# Patient Record
Sex: MALE
[De-identification: names, ages, dates, MRNs, and addresses within clinical notes are randomized per-mention and may not be internally consistent; named-entity substitution may affect disease eponyms.]

---

## 2021-02-08 PROBLEM — Z00.00 ENCOUNTER FOR PREVENTIVE HEALTH EXAMINATION: Status: ACTIVE | Noted: 2021-02-08

## 2021-02-09 ENCOUNTER — APPOINTMENT (OUTPATIENT)
Dept: UROLOGY | Facility: CLINIC | Age: 36
End: 2021-02-09

## 2021-03-16 ENCOUNTER — APPOINTMENT (OUTPATIENT)
Dept: UROLOGY | Facility: CLINIC | Age: 36
End: 2021-03-16
Payer: COMMERCIAL

## 2021-03-16 VITALS
TEMPERATURE: 97.2 F | WEIGHT: 192 LBS | BODY MASS INDEX: 30.13 KG/M2 | HEIGHT: 67 IN | DIASTOLIC BLOOD PRESSURE: 71 MMHG | SYSTOLIC BLOOD PRESSURE: 115 MMHG | HEART RATE: 56 BPM

## 2021-03-16 DIAGNOSIS — N50.811 RIGHT TESTICULAR PAIN: ICD-10-CM

## 2021-03-16 PROCEDURE — 99204 OFFICE O/P NEW MOD 45 MIN: CPT | Mod: 25

## 2021-03-16 PROCEDURE — 51798 US URINE CAPACITY MEASURE: CPT

## 2021-03-16 PROCEDURE — 99072 ADDL SUPL MATRL&STAF TM PHE: CPT

## 2021-03-16 NOTE — ASSESSMENT
[FreeTextEntry1] : Urinary urgency:\par PVR: 0 ml. \par Will get Urinalysis with reflex Urine culture. \par Will inform results.\par  \par Right testis pain:\par Will get Scrotal Ultrasound. \par Will inform results. \par \par Hypogonadism:\par Discussed risks and benefits of Testosterone replacement therapy especially affect on spermatogenesis. \par Will consider. \par Will get Total, Free and Bio available Testosterone with LH, FSH and PRL. \par Will inform results. \par \par

## 2021-03-16 NOTE — PHYSICAL EXAM
[Normal Appearance] : normal appearance [General Appearance - In No Acute Distress] : no acute distress [] : no respiratory distress [Abdomen Soft] : soft [Abdomen Tenderness] : non-tender [Costovertebral Angle Tenderness] : no ~M costovertebral angle tenderness [Urethral Meatus] : meatus normal [Penis Abnormality] : normal circumcised penis [Scrotum] : the scrotum was normal [Epididymis] : the epididymides were normal [Testes Mass (___cm)] : there were no testicular masses [Prostate Enlargement] : the prostate was not enlarged [Prostate Tenderness] : the prostate was not tender [No Prostate Nodules] : no prostate nodules [FreeTextEntry1] : right testis tender to touch  [Normal Station and Gait] : the gait and station were normal for the patient's age [Skin Color & Pigmentation] : normal skin color and pigmentation [No Focal Deficits] : no focal deficits [Oriented To Time, Place, And Person] : oriented to person, place, and time

## 2021-03-16 NOTE — HISTORY OF PRESENT ILLNESS
[FreeTextEntry1] : 34 yo male presents for Hypogonadism. \par Had Testosterone checked for feeling tired, fatigued and low libido: low. \par Rates erections as 3-4/5. \par Reports slower stream, urinates every 1-2 hours or so during the day. Nocturia of 0-1 x. \par Endorses urgency and post void dribbling.\par Denies hesitancy, straining, intermittency, incontinence, sense of incomplete emptying.\par Denies dysuria, hematuria, lower abdominal or flank pain, fever, chills or rigors.\par Has off and on right testis pain. \par No family history of Prostate cancer. \par Patient  and has 2 children. \par \par

## 2021-03-16 NOTE — REVIEW OF SYSTEMS
[Feeling Tired] : feeling tired [Recent Weight Gain (___ Lbs)] : recent [unfilled] ~Ulb weight gain [Dry Eyes] : dryness of the eyes [Loss of interest] : loss of interest in sexual activity [Poor quality erections] : Poor quality erections [Joint Pain] : joint pain [Negative] : Heme/Lymph

## 2021-03-16 NOTE — LETTER BODY
[Dear  ___] : Dear  [unfilled], [Consult Letter:] : I had the pleasure of evaluating your patient, [unfilled]. [( Thank you for referring [unfilled] for consultation for _____ )] : Thank you for referring [unfilled] for consultation for [unfilled] [Please see my note below.] : Please see my note below. [Consult Closing:] : Thank you very much for allowing me to participate in the care of this patient.  If you have any questions, please do not hesitate to contact me. [Sincerely,] : Sincerely, [FreeTextEntry3] : Sherif Persaud MD\par  of Urology\par [facility]\par \par Offices:\par [facility] @\par [address], [place] [address]\par and\par [address], [place] [address], [address]\par and\par [address] [address]\par \par TEL: [phone]\par FAX: [phone]

## 2021-03-17 LAB
APPEARANCE: CLEAR
BILIRUBIN URINE: NEGATIVE
BLOOD URINE: NEGATIVE
COLOR: NORMAL
GLUCOSE QUALITATIVE U: NEGATIVE
KETONES URINE: NEGATIVE
LEUKOCYTE ESTERASE URINE: NEGATIVE
NITRITE URINE: NEGATIVE
PH URINE: 5.5
PROTEIN URINE: NEGATIVE
SPECIFIC GRAVITY URINE: 1.03
UROBILINOGEN URINE: NORMAL

## 2022-01-25 ENCOUNTER — APPOINTMENT (OUTPATIENT)
Dept: UROLOGY | Facility: CLINIC | Age: 37
End: 2022-01-25
Payer: COMMERCIAL

## 2022-01-25 VITALS — HEART RATE: 63 BPM | DIASTOLIC BLOOD PRESSURE: 99 MMHG | SYSTOLIC BLOOD PRESSURE: 118 MMHG | TEMPERATURE: 98 F

## 2022-01-25 DIAGNOSIS — E29.1 TESTICULAR HYPOFUNCTION: ICD-10-CM

## 2022-01-25 LAB
FSH SERPL-MCNC: 3.4 IU/L
LH SERPL-ACNC: 3 IU/L
PROLACTIN SERPL-MCNC: 83.7 NG/ML

## 2022-01-25 PROCEDURE — 99213 OFFICE O/P EST LOW 20 MIN: CPT

## 2022-01-25 NOTE — HISTORY OF PRESENT ILLNESS
[FreeTextEntry1] : 35 yo male presents for follow up. \par Tried Tadalafil 10 mg gets inconsistent response. \par Complaining of low libido. \par Saw Endocrinologist, has follow up later this month. \par Has had Epidural 2 x, urinating better and improved testis pain. \par Has off and on headache after Epidural. \par \par Initially seen for Hypogonadism. \par Had Testosterone checked for feeling tired, fatigued and low libido: low. \par Rated erections as 3-4/5. \par Reported slower stream, urinates every 1-2 hours or so during the day. Nocturia of 0-1 x. \par Endorsed urgency and post void dribbling.\par Denied hesitancy, straining, intermittency, incontinence, sense of incomplete emptying.\par Denied dysuria, hematuria, lower abdominal or flank pain, fever, chills or rigors.\par Had off and on right testis pain. \par No family history of Prostate cancer. \par Patient  and has 2 children. \par \par

## 2022-01-25 NOTE — LETTER BODY
[Dear  ___] : Dear  [unfilled], [Courtesy Letter:] : I had the pleasure of seeing your patient, [unfilled], in my office today. [Please see my note below.] : Please see my note below. [Sincerely,] : Sincerely, [FreeTextEntry3] : Sherif Persaud MD\par  of Urology\par [facility]\par \par Offices:\par [facility] @\par [address], [place] [address]\par and\par [address] [place], [place] [address], [address]\par and\par [address] [address]\par \par TEL: [phone]\par FAX: [phone]

## 2022-01-25 NOTE — ASSESSMENT
[FreeTextEntry1] : Reviewed outside records. \par Testosterone: 362(1/20/22). \par \par Erectile dysfunction:\par Discussed treatment options. Discussed proper use of Tadalafil. \par Will try 20 mg. \par \par Hypogonadism:\par Discussed work up so far. \par Recommended to follow up with Endocrinologist regarding elevated PRL. \par \par Return to office in 3 months or sooner if any issues.

## 2022-01-26 LAB
TESTOSTERONE FREE/WEAKLY BND: 98.3 NG/DL
TESTOSTERONE TOTAL S: 375 NG/DL
TESTOSTERONE, % FREE/WEAKLY BND: 26.2 %

## 2022-04-26 ENCOUNTER — APPOINTMENT (OUTPATIENT)
Dept: UROLOGY | Facility: CLINIC | Age: 37
End: 2022-04-26
Payer: COMMERCIAL

## 2022-04-26 VITALS — TEMPERATURE: 97.8 F

## 2022-04-26 PROCEDURE — 99213 OFFICE O/P EST LOW 20 MIN: CPT

## 2022-04-26 NOTE — HISTORY OF PRESENT ILLNESS
[FreeTextEntry1] : 35 yo male presents for follow up. \par Saw Endocrinologist: Markus Fuentes, found to have Pituitary lesion, on medication. \par Also saw Neurologist for headaches\par With Tadalafil 20 mg better response 5/5, is able to attain and maintain the erections. \par \par Initially seen for Hypogonadism. \par Had Testosterone checked for feeling tired, fatigued and low libido: low. \par Rated erections as 3-4/5. \par Reported slower stream, urinates every 1-2 hours or so during the day. Nocturia of 0-1 x. \par Endorsed urgency and post void dribbling.\par Denied hesitancy, straining, intermittency, incontinence, sense of incomplete emptying.\par Denied dysuria, hematuria, lower abdominal or flank pain, fever, chills or rigors.\par Had off and on right testis pain. \par No family history of Prostate cancer. \par Patient  and has 2 children. \par \par

## 2022-04-26 NOTE — ASSESSMENT
[FreeTextEntry1] : Will get records from Endocrinologist. \par \par Erectile dysfunction:\par Continue Tadalafil \par \par Return to office in 6 months or sooner if any issues.

## 2022-10-25 ENCOUNTER — APPOINTMENT (OUTPATIENT)
Dept: UROLOGY | Facility: CLINIC | Age: 37
End: 2022-10-25

## 2022-10-25 VITALS
HEIGHT: 67 IN | SYSTOLIC BLOOD PRESSURE: 114 MMHG | TEMPERATURE: 97.1 F | WEIGHT: 189 LBS | DIASTOLIC BLOOD PRESSURE: 68 MMHG | BODY MASS INDEX: 29.66 KG/M2

## 2022-10-25 PROCEDURE — 99213 OFFICE O/P EST LOW 20 MIN: CPT

## 2022-10-30 NOTE — HISTORY OF PRESENT ILLNESS
[FreeTextEntry1] : 38 yo male presents for follow up. \par With Tadalafil 20 mg better response, 5/5. Able to attain and maintain the erections. \par \par Saw Endocrinologist: Markus Fuentes, found to have Pituitary lesion, still on medication. \par Also saw Neurologist for headaches. Nothing found. headache better: possibly Migraine. \par \par \par Initially seen for Hypogonadism. \par Had Testosterone checked for feeling tired, fatigued and low libido: low. \par Rated erections as 3-4/5. \par Reported slower stream, urinates every 1-2 hours or so during the day. Nocturia of 0-1 x. \par Endorsed urgency and post void dribbling.\par Denied hesitancy, straining, intermittency, incontinence, sense of incomplete emptying.\par Denied dysuria, hematuria, lower abdominal or flank pain, fever, chills or rigors.\par Had off and on right testis pain. \par No family history of Prostate cancer. \par Patient  and has 2 children. \par \par

## 2022-10-30 NOTE — LETTER BODY
[Dear  ___] : Dear  [unfilled], [Courtesy Letter:] : I had the pleasure of seeing your patient, [unfilled], in my office today. [Please see my note below.] : Please see my note below. [Sincerely,] : Sincerely, [FreeTextEntry3] : Sherif Persaud MD\par  of Urology\par [facility]\par \par [facility]\par Offices:\par [address] [facility] [address]\par [address] [address]\par [address], [address]\par \par TEL: [phone]\par FAX: [phone]

## 2022-10-30 NOTE — ASSESSMENT
[FreeTextEntry1] : Erectile dysfunction \par Continue Tadalafil 20 mg PRN. \par \par Return to office in 1 year or sooner if any issues.

## 2023-07-07 ENCOUNTER — NON-APPOINTMENT (OUTPATIENT)
Age: 38
End: 2023-07-07

## 2023-07-28 ENCOUNTER — NON-APPOINTMENT (OUTPATIENT)
Age: 38
End: 2023-07-28

## 2023-07-28 ENCOUNTER — LABORATORY RESULT (OUTPATIENT)
Age: 38
End: 2023-07-28

## 2023-07-28 ENCOUNTER — APPOINTMENT (OUTPATIENT)
Dept: UROLOGY | Facility: CLINIC | Age: 38
End: 2023-07-28
Payer: COMMERCIAL

## 2023-07-28 VITALS
RESPIRATION RATE: 16 BRPM | WEIGHT: 189 LBS | BODY MASS INDEX: 29.66 KG/M2 | HEART RATE: 60 BPM | DIASTOLIC BLOOD PRESSURE: 66 MMHG | HEIGHT: 67 IN | SYSTOLIC BLOOD PRESSURE: 110 MMHG | OXYGEN SATURATION: 97 %

## 2023-07-28 DIAGNOSIS — N43.3 HYDROCELE, UNSPECIFIED: ICD-10-CM

## 2023-07-28 PROCEDURE — 99214 OFFICE O/P EST MOD 30 MIN: CPT

## 2023-07-28 RX ORDER — IBUPROFEN 600 MG/1
600 TABLET, FILM COATED ORAL 3 TIMES DAILY
Qty: 15 | Refills: 1 | Status: ACTIVE | COMMUNITY
Start: 2023-07-28 | End: 1900-01-01

## 2023-08-03 DIAGNOSIS — R82.998 OTHER ABNORMAL FINDINGS IN URINE: ICD-10-CM

## 2023-08-09 LAB
HSV 1+2 IGG SER IA-IMP: POSITIVE
HSV1 IGG SER QL: 52.6 INDEX

## 2023-08-10 ENCOUNTER — APPOINTMENT (OUTPATIENT)
Dept: DERMATOLOGY | Facility: CLINIC | Age: 38
End: 2023-08-10
Payer: COMMERCIAL

## 2023-08-10 PROCEDURE — 99204 OFFICE O/P NEW MOD 45 MIN: CPT | Mod: 25

## 2023-08-10 PROCEDURE — 17110 DESTRUCTION B9 LES UP TO 14: CPT

## 2023-08-11 ENCOUNTER — OUTPATIENT (OUTPATIENT)
Dept: OUTPATIENT SERVICES | Facility: HOSPITAL | Age: 38
LOS: 1 days | End: 2023-08-11
Payer: COMMERCIAL

## 2023-08-11 ENCOUNTER — APPOINTMENT (OUTPATIENT)
Dept: ULTRASOUND IMAGING | Facility: CLINIC | Age: 38
End: 2023-08-11
Payer: COMMERCIAL

## 2023-08-11 DIAGNOSIS — R82.998 OTHER ABNORMAL FINDINGS IN URINE: ICD-10-CM

## 2023-08-11 PROCEDURE — 76770 US EXAM ABDO BACK WALL COMP: CPT

## 2023-08-11 PROCEDURE — 76770 US EXAM ABDO BACK WALL COMP: CPT | Mod: 26

## 2023-08-15 ENCOUNTER — TRANSCRIPTION ENCOUNTER (OUTPATIENT)
Age: 38
End: 2023-08-15

## 2023-08-15 ENCOUNTER — APPOINTMENT (OUTPATIENT)
Dept: UROLOGY | Facility: CLINIC | Age: 38
End: 2023-08-15
Payer: COMMERCIAL

## 2023-08-15 VITALS
DIASTOLIC BLOOD PRESSURE: 68 MMHG | WEIGHT: 180 LBS | HEIGHT: 67 IN | SYSTOLIC BLOOD PRESSURE: 110 MMHG | HEART RATE: 53 BPM | BODY MASS INDEX: 28.25 KG/M2

## 2023-08-15 DIAGNOSIS — R39.15 URGENCY OF URINATION: ICD-10-CM

## 2023-08-15 DIAGNOSIS — N50.811 RIGHT TESTICULAR PAIN: ICD-10-CM

## 2023-08-15 DIAGNOSIS — Z71.1 PERSON WITH FEARED HEALTH COMPLAINT IN WHOM NO DIAGNOSIS IS MADE: ICD-10-CM

## 2023-08-15 DIAGNOSIS — N50.812 RIGHT TESTICULAR PAIN: ICD-10-CM

## 2023-08-15 PROBLEM — N43.3 BILATERAL HYDROCELE: Status: ACTIVE | Noted: 2023-08-15

## 2023-08-15 LAB
APPEARANCE: ABNORMAL
BACTERIA UR CULT: NORMAL
BILIRUBIN URINE: NEGATIVE
BLOOD URINE: NEGATIVE
C TRACH RRNA SPEC QL NAA+PROBE: NOT DETECTED
COLOR: YELLOW
CYTOMEGALOVIRUS ABS IGM: <8 AU/ML
GLUCOSE QUALITATIVE U: NEGATIVE MG/DL
HERPES SIMPLEX 1 AND 2 ABS IGM: 0.45 IV
HIV1+2 AB SPEC QL IA.RAPID: NONREACTIVE
HSV 1+2 IGG SER IA-IMP: NEGATIVE
HSV 1+2 IGG SER IA-IMP: POSITIVE
HSV1 IGG SER QL: 47.3 INDEX
HSV2 IGG SER QL: 0.03 INDEX
KETONES URINE: NEGATIVE MG/DL
LEUKOCYTE ESTERASE URINE: NEGATIVE
N GONORRHOEA RRNA SPEC QL NAA+PROBE: NOT DETECTED
NITRITE URINE: NEGATIVE
PH URINE: 7
PROTEIN URINE: NEGATIVE MG/DL
RPR SER-TITR: NORMAL
RUBV IGM FLD-ACNC: <10 AU/ML
SOURCE AMPLIFICATION: NORMAL
SPECIFIC GRAVITY URINE: 1.02
TOXOPLASMA GONDII ABS IGM: <3 AU/ML
UROBILINOGEN URINE: 1 MG/DL

## 2023-08-15 PROCEDURE — 99214 OFFICE O/P EST MOD 30 MIN: CPT

## 2023-08-15 RX ORDER — ALFUZOSIN HYDROCHLORIDE 10 MG/1
10 TABLET, EXTENDED RELEASE ORAL DAILY
Qty: 30 | Refills: 5 | Status: ACTIVE | COMMUNITY
Start: 2023-08-15 | End: 1900-01-01

## 2023-08-15 NOTE — ASSESSMENT
[FreeTextEntry1] : Reviewed records. Discussed imaging.   Testis pain:  Hydrocele: Discussed treatment options- continued observation, aspiration and surgical repair. Risks and benefits of each were discussed.  Discussed does not have clinically significant hydrocele. Discussed concern testis pain could be secondary to back problem. Will get Urinalysis and Urine culture.  Take Ibuprofen as needed.  STD screening: Will get sexually transmitted disease panel.  Will inform results.

## 2023-08-15 NOTE — HISTORY OF PRESENT ILLNESS
[FreeTextEntry1] : 39 yo male presents for testis pain.  Has been having L>R 3-4/10 testis pain for last 2 weeks.  Has history of disc prolapse and has had on and off left testis pain with flare up in the past.  Had scrotal ultrasound. Denies constipation. Denies any difficulty with urination except has on and off urgency. Denies dysuria, hematuria, lower abdominal or flank pain, nausea, vomiting, fever, chills or rigors.  Stopped Tadalafil. In the past with Tadalafil 20 mg got 5/5 response.  Was able to attain and maintain the erections.  Was taking Cabergoline, had a headache. Will like to have STD screen.  Saw Endocrinologist: Markus Fuentes, found to have Pituitary lesion, still on medication.  Also saw Neurologist for headaches. Nothing found. headache better: possibly Migraine.   Initially seen for Hypogonadism.  Had Testosterone checked for feeling tired, fatigued and low libido: low.  Rated erections as 3-4/5.  Reported slower stream, urinates every 1-2 hours or so during the day. Nocturia of 0-1 x.  Endorsed urgency and post void dribbling. Denied hesitancy, straining, intermittency, incontinence, sense of incomplete emptying. Denied dysuria, hematuria, lower abdominal or flank pain, fever, chills or rigors. Had off and on right testis pain.  No family history of Prostate cancer.  Patient  and has 2 children.

## 2023-08-15 NOTE — PHYSICAL EXAM
[Urethral Meatus] : meatus normal [Penis Abnormality] : normal circumcised penis [Scrotum] : the scrotum was normal [Testes Tenderness] : no tenderness of the testes [Testes Mass (___cm)] : there were no testicular masses [Normal Appearance] : normal appearance [General Appearance - In No Acute Distress] : no acute distress [FreeTextEntry1] : normal peripheral circulation  [] : no respiratory distress [Oriented To Time, Place, And Person] : oriented to person, place, and time

## 2023-08-21 ENCOUNTER — APPOINTMENT (OUTPATIENT)
Dept: DERMATOLOGY | Facility: CLINIC | Age: 38
End: 2023-08-21

## 2023-09-04 PROBLEM — Z71.1 CONCERN ABOUT STD IN MALE WITHOUT DIAGNOSIS: Status: ACTIVE | Noted: 2023-07-28

## 2023-09-10 NOTE — ASSESSMENT
[FreeTextEntry1] : Reviewed records. Discussed labs and imaging.   Testis pain: Urinary urgency: Discussed treatment options, will start Alfuzosin. Prescribed Alfuzosin. Explained common side effects: nasal congestion, cough, muscle pain, back pain, dizziness, orthostatic hypotension, somnolence, retrograde ejaculation, decreased libido and erectile dysfunction.  STD screen: Discussed labs.  Return to office in 4 weeks or sooner if any issues.

## 2023-09-10 NOTE — PHYSICAL EXAM
[Normal Appearance] : normal appearance [General Appearance - In No Acute Distress] : no acute distress [] : no respiratory distress [FreeTextEntry1] : normal peripheral circulation  [Oriented To Time, Place, And Person] : oriented to person, place, and time

## 2023-09-10 NOTE — HISTORY OF PRESENT ILLNESS
[FreeTextEntry1] : 38 years old male presents for follow up  Had STD testing and Renal and Bladder Ultrasound  Same testis pain.  No personal history or family history of kidney stone.   testis pain.  Has been having L>R 3-4/10 testis pain for last 2 weeks.  Has history of disc prolapse and has had on and off left testis pain with flare up in the past.  Had scrotal ultrasound. Denies constipation. Denies any difficulty with urination except has on and off urgency. Denies dysuria, hematuria, lower abdominal or flank pain, nausea, vomiting, fever, chills or rigors.  Stopped Tadalafil. In the past with Tadalafil 20 mg got 5/5 response.  Was able to attain and maintain the erections.  Was taking Cabergoline, had a headache. Will like to have STD screen.  Saw Endocrinologist: Markus Fuentes, found to have Pituitary lesion, still on medication.  Also saw Neurologist for headaches. Nothing found. headache better: possibly Migraine.   Initially seen for Hypogonadism.  Had Testosterone checked for feeling tired, fatigued and low libido: low.  Rated erections as 3-4/5.  Reported slower stream, urinates every 1-2 hours or so during the day. Nocturia of 0-1 x.  Endorsed urgency and post void dribbling. Denied hesitancy, straining, intermittency, incontinence, sense of incomplete emptying. Denied dysuria, hematuria, lower abdominal or flank pain, fever, chills or rigors. Had off and on right testis pain.  No family history of Prostate cancer.  Patient  and has 2 children.

## 2023-09-14 ENCOUNTER — RESULT REVIEW (OUTPATIENT)
Age: 38
End: 2023-09-14

## 2023-09-15 ENCOUNTER — APPOINTMENT (OUTPATIENT)
Dept: DERMATOLOGY | Facility: CLINIC | Age: 38
End: 2023-09-15
Payer: COMMERCIAL

## 2023-09-15 PROCEDURE — 11900 INJECT SKIN LESIONS </W 7: CPT | Mod: 59

## 2023-09-15 PROCEDURE — 99215 OFFICE O/P EST HI 40 MIN: CPT | Mod: 25

## 2023-09-15 PROCEDURE — 17110 DESTRUCTION B9 LES UP TO 14: CPT

## 2023-09-15 PROCEDURE — 11102 TANGNTL BX SKIN SINGLE LES: CPT | Mod: 59

## 2023-10-02 ENCOUNTER — APPOINTMENT (OUTPATIENT)
Dept: DERMATOLOGY | Facility: CLINIC | Age: 38
End: 2023-10-02

## 2023-10-02 ENCOUNTER — APPOINTMENT (OUTPATIENT)
Dept: DERMATOLOGY | Facility: CLINIC | Age: 38
End: 2023-10-02
Payer: COMMERCIAL

## 2023-10-02 PROCEDURE — 99214 OFFICE O/P EST MOD 30 MIN: CPT

## 2023-10-26 ENCOUNTER — APPOINTMENT (OUTPATIENT)
Dept: DERMATOLOGY | Facility: CLINIC | Age: 38
End: 2023-10-26

## 2023-12-14 ENCOUNTER — APPOINTMENT (OUTPATIENT)
Dept: DERMATOLOGY | Facility: CLINIC | Age: 38
End: 2023-12-14
Payer: COMMERCIAL

## 2023-12-14 ENCOUNTER — NON-APPOINTMENT (OUTPATIENT)
Age: 38
End: 2023-12-14

## 2023-12-14 DIAGNOSIS — L60.3 NAIL DYSTROPHY: ICD-10-CM

## 2023-12-14 DIAGNOSIS — R20.8 OTHER DISTURBANCES OF SKIN SENSATION: ICD-10-CM

## 2023-12-14 PROCEDURE — 99203 OFFICE O/P NEW LOW 30 MIN: CPT

## 2023-12-18 NOTE — ASSESSMENT
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 Notes scale building and change at site Favor scarring and callus formation 2/2 prior procedures, no visible verrucous features on dermascopy - Diagnosis, chronic nature, disease course, treatment options and goals of therapy discussed - Start OTC compound W liquid, apply duct tape, and repeat q48 hrs.  - Photo in chart for monitoring; will biopsy to rule out SCC if no improvement at next visit RTC 3-4 months  #ELVA [facility] midback - PT referral Referral line # [phone] given to patient for scheduling assistance

## 2023-12-18 NOTE — HISTORY OF PRESENT ILLNESS
[FreeTextEntry1] : Wart on L 5th finger [de-identified] : Mr. RUSTY DICKENS is a 38 year old M here for evaluation of below   #Wart on L 5th finger, since summer 2023.  s/p cryotherapy (x2) and candida antigen (x2) without improvement. Feels area has progressed into the nail. Last seen in Oct 2023.   #Sharp sensation R midback. Notes having back pain    Derm Hx: usually sees Dr. Olivier Personal hx of skin cancer: no FHx of skin cancer: no Social Hx: [place]

## 2023-12-18 NOTE — PHYSICAL EXAM
[Alert] : alert [Oriented x 3] : ~L oriented x 3 [Well Nourished] : well nourished [Conjunctiva Non-injected] : conjunctiva non-injected [No Visual Lymphadenopathy] : no visual  lymphadenopathy [No Clubbing] : no clubbing [No Edema] : no edema [No Bromhidrosis] : no bromhidrosis [No Chromhidrosis] : no chromhidrosis [Declined] : declined [FreeTextEntry3] : Focused exam only (see below) per patient request:  L fingertip with focal nail dystrophy, distal nailbed scarring, and callus formation. No verrucous features on dermascopy  R midback WNL; no skin lesions

## 2023-12-26 ENCOUNTER — APPOINTMENT (OUTPATIENT)
Dept: UROLOGY | Facility: CLINIC | Age: 38
End: 2023-12-26
Payer: COMMERCIAL

## 2023-12-26 VITALS
HEART RATE: 54 BPM | BODY MASS INDEX: 29.19 KG/M2 | WEIGHT: 186 LBS | OXYGEN SATURATION: 98 % | SYSTOLIC BLOOD PRESSURE: 115 MMHG | HEIGHT: 67 IN | DIASTOLIC BLOOD PRESSURE: 75 MMHG

## 2023-12-26 DIAGNOSIS — Z30.09 ENCOUNTER FOR OTHER GENERAL COUNSELING AND ADVICE ON CONTRACEPTION: ICD-10-CM

## 2023-12-26 DIAGNOSIS — N52.9 MALE ERECTILE DYSFUNCTION, UNSPECIFIED: ICD-10-CM

## 2023-12-26 PROCEDURE — 99214 OFFICE O/P EST MOD 30 MIN: CPT

## 2023-12-26 RX ORDER — TADALAFIL 20 MG/1
20 TABLET ORAL
Qty: 45 | Refills: 3 | Status: ACTIVE | COMMUNITY
Start: 2022-01-25 | End: 1900-01-01

## 2024-01-04 PROBLEM — Z30.09 VASECTOMY EVALUATION: Status: ACTIVE | Noted: 2024-01-04

## 2024-01-04 PROBLEM — N52.9 MALE ERECTILE DISORDER: Status: ACTIVE | Noted: 2022-01-25

## 2024-01-04 NOTE — END OF VISIT
[Time Spent: ___ minutes] : I have spent [unfilled] minutes of time on the encounter. [FreeTextEntry3] : All medical record entries made by the Scribe were at my, Dr. Sherif Persaud, direction and personally dictated by me on 12/26/2023. I have reviewed the chart and agree that the record accurately reflects my personal performance of the history, physical exam, assessment and plan. I have also personally directed, reviewed, and agreed with the chart.

## 2024-01-04 NOTE — HISTORY OF PRESENT ILLNESS
[FreeTextEntry1] : 38 years old male presents for follow up.  After last visit took Alfuzosin until November. Testicle pain resolved so stopped taking it as did not note any difference with urination.  Since stopping, has not had any testicle pain, has on and off dysuria.   Follow with endocrinologist. Still complains of low libido. Uses Tadalafil 20 mg as needed.  Pt interested in undergoing vasectomy.   Renal and Bladder Ultrasound (8/11/2023): No renal mass, hydronephrosis or calculi. Postvoid residual: 13 ml. Prostate volume: 18 cc with central calcification.  Seen on 7/28/23 for testis pain.  Had been having L>R 3-4/10 testis pain for last 2 weeks.  Has history of disc prolapse and has had on and off left testis pain with flare up in the past.  Scrotal ultrasound (7/17/2023): Normal testes. Bilateral small hydroceles.  Saw Endocrinologist: Markus Fuentes, found to have Pituitary lesion, still on medication.  Also saw Neurologist for headaches. Nothing found. headache better: possibly Migraine.   Initially seen for Hypogonadism.  Had Testosterone checked for feeling tired, fatigued and low libido: low.  Rated erections as 3-4/5.  Reported slower stream, urinates every 1-2 hours or so during the day. Nocturia of 0-1 x.  Endorsed urgency and post void dribbling. Denied hesitancy, straining, intermittency, incontinence, sense of incomplete emptying. Denied dysuria, hematuria, lower abdominal or flank pain, fever, chills or rigors. Had off and on right testis pain.  No family history of Prostate cancer.  Patient  and has 2 children.

## 2024-01-04 NOTE — ASSESSMENT
[FreeTextEntry1] : Vasectomy evaluation: Extensively discussed the procedure and reviewed the likely postoperative course.  He understands the potential side effects of anesthesia, bleeding, scrotal hematoma, wound infection, epididymal orchitis, epididymal congestion, chronic testicular pain requiring further surgery, sperm granuloma, antisperm antibodies, early recanalization, spontaneous recanalization with pregnancy after demonstration of azoospermia and the possible association with prostate cancer.  He is aware of the recommendation for PSA and ROSA ISELA yearly after the age of 50.  He will need to have a semen analysis after 6-8 weeks. Discussed the need to continue to use contraception until he is notified by us of his sterility.  Considering physical exam recommended that we do this under general anesthesia at [facility].  Patient agreeable.  Will schedule next available.  Erectile dysfunction:  Will continue Tadalafil. Recommended pt follow up with endocrinologist and have testosterone levels checked.

## 2024-02-09 ENCOUNTER — APPOINTMENT (OUTPATIENT)
Dept: DERMATOLOGY | Facility: CLINIC | Age: 39
End: 2024-02-09

## 2024-03-19 ENCOUNTER — APPOINTMENT (OUTPATIENT)
Dept: DERMATOLOGY | Facility: CLINIC | Age: 39
End: 2024-03-19

## 2024-05-20 ENCOUNTER — APPOINTMENT (OUTPATIENT)
Dept: UROLOGY | Facility: HOSPITAL | Age: 39
End: 2024-05-20

## 2024-05-28 ENCOUNTER — APPOINTMENT (OUTPATIENT)
Dept: UROLOGY | Facility: CLINIC | Age: 39
End: 2024-05-28

## 2024-10-01 ENCOUNTER — OFFICE (OUTPATIENT)
Dept: URBAN - METROPOLITAN AREA CLINIC 12 | Facility: CLINIC | Age: 39
Setting detail: OPHTHALMOLOGY
End: 2024-10-01
Payer: COMMERCIAL

## 2024-10-01 ENCOUNTER — RX ONLY (RX ONLY)
Age: 39
End: 2024-10-01
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
## 2024-10-01 DIAGNOSIS — H52.13: ICD-10-CM

## 2024-10-01 DIAGNOSIS — H00.11: ICD-10-CM

## 2024-10-01 PROCEDURE — OPTASE LID OPTASE LID SCRUB: Performed by: STUDENT IN AN ORGANIZED HEALTH CARE EDUCATION/TRAINING PROGRAM

## 2024-10-01 PROCEDURE — 92002 INTRM OPH EXAM NEW PATIENT: CPT | Performed by: STUDENT IN AN ORGANIZED HEALTH CARE EDUCATION/TRAINING PROGRAM

## 2024-10-01 ASSESSMENT — KERATOMETRY
OS_K2POWER_DIOPTERS: 41.00
OD_AXISANGLE_DEGREES: 007
OD_K1POWER_DIOPTERS: 40.75
OS_AXISANGLE_DEGREES: 081
OS_K1POWER_DIOPTERS: 40.50
OD_K2POWER_DIOPTERS: 41.25

## 2024-10-01 ASSESSMENT — CONFRONTATIONAL VISUAL FIELD TEST (CVF)
OS_FINDINGS: FULL
OD_FINDINGS: FULL

## 2024-10-01 ASSESSMENT — REFRACTION_AUTOREFRACTION
OS_CYLINDER: -1.00
OD_CYLINDER: -0.25
OS_AXIS: 174
OD_SPHERE: +0.25
OD_AXIS: 069
OS_SPHERE: +1.00

## 2024-10-01 ASSESSMENT — TONOMETRY: OS_IOP_MMHG: 10

## 2024-10-01 ASSESSMENT — VISUAL ACUITY
OD_BCVA: 20/25-2
OS_BCVA: 20/20-1

## 2024-11-05 ENCOUNTER — OFFICE (OUTPATIENT)
Dept: URBAN - METROPOLITAN AREA CLINIC 12 | Facility: CLINIC | Age: 39
Setting detail: OPHTHALMOLOGY
End: 2024-11-05
Payer: COMMERCIAL

## 2024-11-05 DIAGNOSIS — H02.89: ICD-10-CM

## 2024-11-05 DIAGNOSIS — H52.13: ICD-10-CM

## 2024-11-05 PROCEDURE — 92014 COMPRE OPH EXAM EST PT 1/>: CPT | Performed by: STUDENT IN AN ORGANIZED HEALTH CARE EDUCATION/TRAINING PROGRAM

## 2024-11-05 ASSESSMENT — KERATOMETRY
OS_K1POWER_DIOPTERS: 40.50
OD_K2POWER_DIOPTERS: 41.00
OS_AXISANGLE_DEGREES: 74
OS_K2POWER_DIOPTERS: 41.25
OD_AXISANGLE_DEGREES: 19
OD_K1POWER_DIOPTERS: 40.50

## 2024-11-05 ASSESSMENT — VISUAL ACUITY
OD_BCVA: 20/25
OS_BCVA: 20/20

## 2024-11-05 ASSESSMENT — LID EXAM ASSESSMENTS
OD_MEIBOMITIS: RUL
OS_MEIBOMITIS: LUL

## 2024-11-05 ASSESSMENT — REFRACTION_AUTOREFRACTION
OD_SPHERE: +0.50
OD_CYLINDER: -0.50
OS_AXIS: 171
OS_CYLINDER: -0.75
OD_AXIS: 76
OS_SPHERE: +1.00

## 2024-11-05 ASSESSMENT — TONOMETRY
OS_IOP_MMHG: 10
OD_IOP_MMHG: 11

## 2024-11-05 ASSESSMENT — CONFRONTATIONAL VISUAL FIELD TEST (CVF)
OS_FINDINGS: FULL
OD_FINDINGS: FULL